# Patient Record
Sex: FEMALE
[De-identification: names, ages, dates, MRNs, and addresses within clinical notes are randomized per-mention and may not be internally consistent; named-entity substitution may affect disease eponyms.]

---

## 2018-06-29 ENCOUNTER — RECORDS - HEALTHEAST (OUTPATIENT)
Dept: ADMINISTRATIVE | Facility: OTHER | Age: 53
End: 2018-06-29

## 2018-07-02 ENCOUNTER — RECORDS - HEALTHEAST (OUTPATIENT)
Dept: ADMINISTRATIVE | Facility: OTHER | Age: 53
End: 2018-07-02

## 2018-07-06 ENCOUNTER — HOSPITAL ENCOUNTER (OUTPATIENT)
Dept: ULTRASOUND IMAGING | Facility: HOSPITAL | Age: 53
Discharge: HOME | End: 2018-07-06

## 2018-07-06 DIAGNOSIS — Z90.79 HISTORY OF TOTAL HYSTERECTOMY WITH REMOVAL OF BOTH TUBES AND OVARIES: ICD-10-CM

## 2018-07-06 DIAGNOSIS — G89.29 CHRONIC ABDOMINAL PAIN: ICD-10-CM

## 2018-07-06 DIAGNOSIS — R10.9 CHRONIC ABDOMINAL PAIN: ICD-10-CM

## 2018-07-06 DIAGNOSIS — Z90.710 HISTORY OF TOTAL HYSTERECTOMY WITH REMOVAL OF BOTH TUBES AND OVARIES: ICD-10-CM

## 2018-07-06 DIAGNOSIS — Z90.722 HISTORY OF TOTAL HYSTERECTOMY WITH REMOVAL OF BOTH TUBES AND OVARIES: ICD-10-CM

## 2025-02-19 ENCOUNTER — TELEPHONE (OUTPATIENT)
Dept: FAMILY MEDICINE | Facility: CLINIC | Age: 60
End: 2025-02-19

## 2025-02-19 NOTE — TELEPHONE ENCOUNTER
Central scheduler called with patient asking what entails in the physical visit that she will have. Patient would want someone to reach out to her and explain this information to her so that they know what they are getting b/c it is needed for the patient to get a green card.

## 2025-02-20 NOTE — TELEPHONE ENCOUNTER
If patient returns call please transfer call to TC team:    Left a detail message on patients voicemail regarding the Immigration physical.      We will bill the insurance first, what the insurance does not cover will be patients responsibility, due when patient picks up the sealed envelope.    Also added to the message that Immigration physical will be done as well as labs, xrays and vaccines that are needed.      Please relay the following    Request to bring the following to the appointment:    Current photo ID,  [id number] card  Passport  Work permit  Immunization records (if Have it)

## 2025-02-26 NOTE — TELEPHONE ENCOUNTER
Spoke to patient and informed me that she has exam done somewhere else. Cancelled her appointment.    done